# Patient Record
Sex: FEMALE | Race: WHITE | NOT HISPANIC OR LATINO | URBAN - METROPOLITAN AREA
[De-identification: names, ages, dates, MRNs, and addresses within clinical notes are randomized per-mention and may not be internally consistent; named-entity substitution may affect disease eponyms.]

---
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
## 2023-06-01 NOTE — ED PROVIDER NOTE - OBJECTIVE STATEMENT
Self 19 year old female with no significant PMHx presents to ED with SI. As per sister the patient expressed if she was not evaluated today that she would attempt suicide by tonight. Patient denies HI, N/V/D, fever, chills, recent travel, sick contacts, or any other medical problems. NKDA. IUTD.
